# Patient Record
Sex: FEMALE | Race: OTHER | NOT HISPANIC OR LATINO | URBAN - METROPOLITAN AREA
[De-identification: names, ages, dates, MRNs, and addresses within clinical notes are randomized per-mention and may not be internally consistent; named-entity substitution may affect disease eponyms.]

---

## 2018-01-12 ENCOUNTER — OUTPATIENT (OUTPATIENT)
Dept: OUTPATIENT SERVICES | Facility: HOSPITAL | Age: 33
LOS: 1 days | End: 2018-01-12
Payer: COMMERCIAL

## 2018-01-12 VITALS
HEIGHT: 64 IN | RESPIRATION RATE: 14 BRPM | WEIGHT: 117.95 LBS | HEART RATE: 68 BPM | DIASTOLIC BLOOD PRESSURE: 79 MMHG | TEMPERATURE: 98 F | SYSTOLIC BLOOD PRESSURE: 110 MMHG

## 2018-01-12 DIAGNOSIS — Z41.9 ENCOUNTER FOR PROCEDURE FOR PURPOSES OTHER THAN REMEDYING HEALTH STATE, UNSPECIFIED: Chronic | ICD-10-CM

## 2018-01-12 DIAGNOSIS — N84.0 POLYP OF CORPUS UTERI: ICD-10-CM

## 2018-01-12 DIAGNOSIS — Z01.818 ENCOUNTER FOR OTHER PREPROCEDURAL EXAMINATION: ICD-10-CM

## 2018-01-12 LAB
HCG SERPL-ACNC: <1 MIU/ML — SIGNIFICANT CHANGE UP
HCT VFR BLD CALC: 37.3 % — SIGNIFICANT CHANGE UP (ref 34.5–45)
HGB BLD-MCNC: 12.4 G/DL — SIGNIFICANT CHANGE UP (ref 11.5–15.5)
MCHC RBC-ENTMCNC: 29.9 PG — SIGNIFICANT CHANGE UP (ref 27–34)
MCHC RBC-ENTMCNC: 33.2 GM/DL — SIGNIFICANT CHANGE UP (ref 32–36)
MCV RBC AUTO: 90 FL — SIGNIFICANT CHANGE UP (ref 80–100)
PLATELET # BLD AUTO: 237 K/UL — SIGNIFICANT CHANGE UP (ref 150–400)
RBC # BLD: 4.15 M/UL — SIGNIFICANT CHANGE UP (ref 3.8–5.2)
RBC # FLD: 10.6 % — SIGNIFICANT CHANGE UP (ref 10.3–14.5)
WBC # BLD: 5.9 K/UL — SIGNIFICANT CHANGE UP (ref 3.8–10.5)
WBC # FLD AUTO: 5.9 K/UL — SIGNIFICANT CHANGE UP (ref 3.8–10.5)

## 2018-01-12 PROCEDURE — 85027 COMPLETE CBC AUTOMATED: CPT

## 2018-01-12 PROCEDURE — 86850 RBC ANTIBODY SCREEN: CPT

## 2018-01-12 PROCEDURE — 84702 CHORIONIC GONADOTROPIN TEST: CPT

## 2018-01-12 PROCEDURE — G0463: CPT

## 2018-01-12 PROCEDURE — 86900 BLOOD TYPING SEROLOGIC ABO: CPT

## 2018-01-12 PROCEDURE — 86901 BLOOD TYPING SEROLOGIC RH(D): CPT

## 2018-01-12 NOTE — H&P PST ADULT - ASSESSMENT
32 year old female with Polyp of Corpus Uteri - scheduled for Dilation & Curettage Hysteroscopy /Polypectomy with Dr Tha Sharif on 1/19/18

## 2018-01-12 NOTE — H&P PST ADULT - HISTORY OF PRESENT ILLNESS
32 year old female  presents for PST prior to Dilation & Curettage hysteroscopy with Dr Tha Sharif on 18 - pt notes she has been seeing fertility specialist Dr Martinez - also notes prior IVF also Egg Transfer with no pregnancies - pt then notes she underwent "saline Sonogram which showed small tissue where the saline water was not going thru so they recommended I have this procedure with Dr Sharif to help me." Pt is electing for scheduled procedure.

## 2018-01-12 NOTE — H&P PST ADULT - NSANTHOSAYNRD_GEN_A_CORE
No. ANASTACIO screening performed.  STOP BANG Legend: 0-2 = LOW Risk; 3-4 = INTERMEDIATE Risk; 5-8 = HIGH Risk

## 2018-01-12 NOTE — H&P PST ADULT - REASON FOR ADMISSION
Pt states " I am having surgery to remove some tissue they think might be causing me to not get pregnant."

## 2018-01-12 NOTE — H&P PST ADULT - PROBLEM SELECTOR PLAN 1
PST labs; CBC, HcG, Type & Screen - No Medical Clearance Needed - pre-op instructions given to pt with understanding verbalized - Hysteroscopy Care Notes Given to pt for review

## 2018-01-18 RX ORDER — SODIUM CHLORIDE 9 MG/ML
1000 INJECTION, SOLUTION INTRAVENOUS
Qty: 0 | Refills: 0 | Status: DISCONTINUED | OUTPATIENT
Start: 2018-01-19 | End: 2018-01-19

## 2018-01-19 ENCOUNTER — OUTPATIENT (OUTPATIENT)
Dept: OUTPATIENT SERVICES | Facility: HOSPITAL | Age: 33
LOS: 1 days | End: 2018-01-19
Payer: COMMERCIAL

## 2018-01-19 VITALS
SYSTOLIC BLOOD PRESSURE: 113 MMHG | OXYGEN SATURATION: 100 % | RESPIRATION RATE: 15 BRPM | DIASTOLIC BLOOD PRESSURE: 74 MMHG | HEART RATE: 60 BPM

## 2018-01-19 VITALS
DIASTOLIC BLOOD PRESSURE: 86 MMHG | WEIGHT: 117.95 LBS | RESPIRATION RATE: 14 BRPM | OXYGEN SATURATION: 100 % | HEART RATE: 76 BPM | HEIGHT: 64 IN | TEMPERATURE: 98 F | SYSTOLIC BLOOD PRESSURE: 125 MMHG

## 2018-01-19 DIAGNOSIS — Z41.9 ENCOUNTER FOR PROCEDURE FOR PURPOSES OTHER THAN REMEDYING HEALTH STATE, UNSPECIFIED: Chronic | ICD-10-CM

## 2018-01-19 DIAGNOSIS — N84.0 POLYP OF CORPUS UTERI: ICD-10-CM

## 2018-01-19 DIAGNOSIS — Z01.818 ENCOUNTER FOR OTHER PREPROCEDURAL EXAMINATION: ICD-10-CM

## 2018-01-19 PROCEDURE — 88305 TISSUE EXAM BY PATHOLOGIST: CPT

## 2018-01-19 PROCEDURE — 88305 TISSUE EXAM BY PATHOLOGIST: CPT | Mod: 26

## 2018-01-19 PROCEDURE — 58558 HYSTEROSCOPY BIOPSY: CPT

## 2018-01-19 RX ORDER — ACETAMINOPHEN 500 MG
1000 TABLET ORAL ONCE
Qty: 0 | Refills: 0 | Status: COMPLETED | OUTPATIENT
Start: 2018-01-19 | End: 2018-01-19

## 2018-01-19 RX ORDER — ACETAMINOPHEN 500 MG
2 TABLET ORAL
Qty: 0 | Refills: 0 | COMMUNITY

## 2018-01-19 RX ORDER — ONDANSETRON 8 MG/1
4 TABLET, FILM COATED ORAL ONCE
Qty: 0 | Refills: 0 | Status: DISCONTINUED | OUTPATIENT
Start: 2018-01-19 | End: 2018-01-19

## 2018-01-19 RX ORDER — IBUPROFEN 200 MG
1 TABLET ORAL
Qty: 0 | Refills: 0 | COMMUNITY

## 2018-01-19 RX ADMIN — SODIUM CHLORIDE 75 MILLILITER(S): 9 INJECTION, SOLUTION INTRAVENOUS at 13:02

## 2018-01-19 RX ADMIN — SODIUM CHLORIDE 75 MILLILITER(S): 9 INJECTION, SOLUTION INTRAVENOUS at 11:28

## 2018-01-19 NOTE — BRIEF OPERATIVE NOTE - PROCEDURE
<<-----Click on this checkbox to enter Procedure Hysteroscopy  01/19/2018    Active  [id number]  Dilation and curettage  01/19/2018    Active  [id number]

## 2018-01-19 NOTE — ASU DISCHARGE PLAN (ADULT/PEDIATRIC). - ACTIVITY LEVEL
no douching/no sports/gym/no intercourse/no heavy lifting/nothing per rectum/no tampons/nothing per vagina/no tub baths/no exercise/x 2 weeks

## 2018-01-19 NOTE — ASU DISCHARGE PLAN (ADULT/PEDIATRIC). - NOTIFY
Persistent Nausea and Vomiting/Inability to Tolerate Liquids or Foods/Increased Irritability or Sluggishness/Pain not relieved by Medications/Unable to Urinate/Bleeding that does not stop/GYN Fever>100.4

## 2018-01-22 LAB — SURGICAL PATHOLOGY FINAL REPORT - CH: SIGNIFICANT CHANGE UP

## 2025-01-14 NOTE — H&P PST ADULT -  CURRENT SWALLOWING
Faxed disability forms to [facility] on 01/14/2025  
(0) swallows foods and liquids w/o difficulty